# Patient Record
Sex: MALE | Race: WHITE | URBAN - METROPOLITAN AREA
[De-identification: names, ages, dates, MRNs, and addresses within clinical notes are randomized per-mention and may not be internally consistent; named-entity substitution may affect disease eponyms.]

---

## 2021-11-29 ENCOUNTER — OFFICE VISIT (OUTPATIENT)
Dept: URBAN - METROPOLITAN AREA CLINIC 51 | Facility: CLINIC | Age: 76
End: 2021-11-29
Payer: MEDICARE

## 2021-11-29 DIAGNOSIS — H33.051 TOTAL RETINAL DETACHMENT, RIGHT EYE: Primary | ICD-10-CM

## 2021-11-29 DIAGNOSIS — L81.4 OTHER MELANIN HYPERPIGMENTATION: ICD-10-CM

## 2021-11-29 DIAGNOSIS — H40.051 OCULAR HYPERTENSION, RIGHT EYE: ICD-10-CM

## 2021-11-29 DIAGNOSIS — H16.143 PUNCTATE KERATITIS, BILATERAL: ICD-10-CM

## 2021-11-29 DIAGNOSIS — H25.12 AGE-RELATED NUCLEAR CATARACT, LEFT EYE: ICD-10-CM

## 2021-11-29 PROCEDURE — 99204 OFFICE O/P NEW MOD 45 MIN: CPT | Performed by: OPTOMETRIST

## 2021-11-29 PROCEDURE — 92250 FUNDUS PHOTOGRAPHY W/I&R: CPT | Performed by: OPTOMETRIST

## 2021-11-29 PROCEDURE — 92133 CPTRZD OPH DX IMG PST SGM ON: CPT | Performed by: OPTOMETRIST

## 2021-11-29 PROCEDURE — 76514 ECHO EXAM OF EYE THICKNESS: CPT | Performed by: OPTOMETRIST

## 2021-11-29 ASSESSMENT — INTRAOCULAR PRESSURE
OS: 15
OD: 33

## 2021-11-29 ASSESSMENT — KERATOMETRY
OD: 43.78
OS: 43.92

## 2021-11-29 ASSESSMENT — VISUAL ACUITY
OS: 20/20
OD: HM

## 2021-11-29 NOTE — IMPRESSION/PLAN
Impression: Punctate keratitis, bilateral: H16.143. Plan: Recommend use Artificial tears QID OU.
warm compresses Gel/ointment QHS

## 2021-11-29 NOTE — IMPRESSION/PLAN
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

## 2021-11-29 NOTE — IMPRESSION/PLAN
Impression: Ocular hypertension, right eye: H40.051. Plan: IOP 33 today OD Unable RNFL [place]- due to RD
[facility] 570/[phone] thick/thin

## 2021-11-29 NOTE — IMPRESSION/PLAN
Impression: Total retinal detachment, right eye: H33.051. Plan: Per pt has been longtime since [address] has gotten worse, will refer to retina *request records* Had melanoma laser procedure in [place] as well, will refer to Dr. Marcia Lynn

## 2021-12-01 ENCOUNTER — OFFICE VISIT (OUTPATIENT)
Dept: URBAN - METROPOLITAN AREA CLINIC 51 | Facility: CLINIC | Age: 76
End: 2021-12-01
Payer: MEDICARE

## 2021-12-01 DIAGNOSIS — C69.31 MALIGNANT NEOPLASM OF RIGHT CHOROID: Primary | ICD-10-CM

## 2021-12-01 PROCEDURE — 76512 OPH US DX B-SCAN: CPT | Performed by: OPHTHALMOLOGY

## 2021-12-01 PROCEDURE — 92242 FLUORESCEIN&ICG ANGIOGRAPHY: CPT | Performed by: OPHTHALMOLOGY

## 2021-12-01 PROCEDURE — 99204 OFFICE O/P NEW MOD 45 MIN: CPT | Performed by: OPHTHALMOLOGY

## 2021-12-01 PROCEDURE — 92134 CPTRZ OPH DX IMG PST SGM RTA: CPT | Performed by: OPHTHALMOLOGY

## 2021-12-01 ASSESSMENT — INTRAOCULAR PRESSURE
OD: 32
OS: 11

## 2021-12-01 NOTE — IMPRESSION/PLAN
Impression: Malignant Neoplasm Right Eye Plan: 2016 Proton Beam radiation at [facility]. Poor vision for some years, gradual loss. Has RD and recurrent melanoma. 9.5 mm. solid tumor. See DR Natalia Lozano - for opinion and probable enucleation.